# Patient Record
Sex: MALE | Race: OTHER | HISPANIC OR LATINO | URBAN - METROPOLITAN AREA
[De-identification: names, ages, dates, MRNs, and addresses within clinical notes are randomized per-mention and may not be internally consistent; named-entity substitution may affect disease eponyms.]

---

## 2019-10-11 ENCOUNTER — INPATIENT (INPATIENT)
Facility: HOSPITAL | Age: 44
LOS: 3 days | Discharge: HOME | DRG: 392 | End: 2019-10-15
Attending: INTERNAL MEDICINE | Admitting: INTERNAL MEDICINE
Payer: MEDICAID

## 2019-10-11 VITALS
TEMPERATURE: 98 F | RESPIRATION RATE: 20 BRPM | OXYGEN SATURATION: 100 % | HEART RATE: 86 BPM | SYSTOLIC BLOOD PRESSURE: 150 MMHG | WEIGHT: 160.06 LBS | DIASTOLIC BLOOD PRESSURE: 93 MMHG

## 2019-10-11 DIAGNOSIS — Z29.9 ENCOUNTER FOR PROPHYLACTIC MEASURES, UNSPECIFIED: ICD-10-CM

## 2019-10-11 DIAGNOSIS — R10.11 RIGHT UPPER QUADRANT PAIN: ICD-10-CM

## 2019-10-11 LAB
ALBUMIN SERPL ELPH-MCNC: 4.1 G/DL — SIGNIFICANT CHANGE UP (ref 3.5–5)
ALP SERPL-CCNC: 102 U/L — SIGNIFICANT CHANGE UP (ref 40–120)
ALT FLD-CCNC: 27 U/L DA — SIGNIFICANT CHANGE UP (ref 10–60)
ANION GAP SERPL CALC-SCNC: 8 MMOL/L — SIGNIFICANT CHANGE UP (ref 5–17)
AST SERPL-CCNC: 12 U/L — SIGNIFICANT CHANGE UP (ref 10–40)
BASOPHILS # BLD AUTO: 0.05 K/UL — SIGNIFICANT CHANGE UP (ref 0–0.2)
BASOPHILS NFR BLD AUTO: 0.8 % — SIGNIFICANT CHANGE UP (ref 0–2)
BILIRUB SERPL-MCNC: 1.8 MG/DL — HIGH (ref 0.2–1.2)
BUN SERPL-MCNC: 9 MG/DL — SIGNIFICANT CHANGE UP (ref 7–18)
CALCIUM SERPL-MCNC: 9.8 MG/DL — SIGNIFICANT CHANGE UP (ref 8.4–10.5)
CHLORIDE SERPL-SCNC: 101 MMOL/L — SIGNIFICANT CHANGE UP (ref 96–108)
CO2 SERPL-SCNC: 25 MMOL/L — SIGNIFICANT CHANGE UP (ref 22–31)
CREAT SERPL-MCNC: 0.79 MG/DL — SIGNIFICANT CHANGE UP (ref 0.5–1.3)
EOSINOPHIL # BLD AUTO: 0.08 K/UL — SIGNIFICANT CHANGE UP (ref 0–0.5)
EOSINOPHIL NFR BLD AUTO: 1.2 % — SIGNIFICANT CHANGE UP (ref 0–6)
GLUCOSE SERPL-MCNC: 233 MG/DL — HIGH (ref 70–99)
HCT VFR BLD CALC: 43.5 % — SIGNIFICANT CHANGE UP (ref 39–50)
HGB BLD-MCNC: 15.2 G/DL — SIGNIFICANT CHANGE UP (ref 13–17)
IMM GRANULOCYTES NFR BLD AUTO: 0.2 % — SIGNIFICANT CHANGE UP (ref 0–1.5)
LIDOCAIN IGE QN: 132 U/L — SIGNIFICANT CHANGE UP (ref 73–393)
LYMPHOCYTES # BLD AUTO: 1.75 K/UL — SIGNIFICANT CHANGE UP (ref 1–3.3)
LYMPHOCYTES # BLD AUTO: 26.8 % — SIGNIFICANT CHANGE UP (ref 13–44)
MCHC RBC-ENTMCNC: 30.2 PG — SIGNIFICANT CHANGE UP (ref 27–34)
MCHC RBC-ENTMCNC: 34.9 GM/DL — SIGNIFICANT CHANGE UP (ref 32–36)
MCV RBC AUTO: 86.5 FL — SIGNIFICANT CHANGE UP (ref 80–100)
MONOCYTES # BLD AUTO: 0.36 K/UL — SIGNIFICANT CHANGE UP (ref 0–0.9)
MONOCYTES NFR BLD AUTO: 5.5 % — SIGNIFICANT CHANGE UP (ref 2–14)
NEUTROPHILS # BLD AUTO: 4.27 K/UL — SIGNIFICANT CHANGE UP (ref 1.8–7.4)
NEUTROPHILS NFR BLD AUTO: 65.5 % — SIGNIFICANT CHANGE UP (ref 43–77)
NRBC # BLD: 0 /100 WBCS — SIGNIFICANT CHANGE UP (ref 0–0)
PLATELET # BLD AUTO: 357 K/UL — SIGNIFICANT CHANGE UP (ref 150–400)
POTASSIUM SERPL-MCNC: 3.8 MMOL/L — SIGNIFICANT CHANGE UP (ref 3.5–5.3)
POTASSIUM SERPL-SCNC: 3.8 MMOL/L — SIGNIFICANT CHANGE UP (ref 3.5–5.3)
PROT SERPL-MCNC: 8.2 G/DL — SIGNIFICANT CHANGE UP (ref 6–8.3)
RBC # BLD: 5.03 M/UL — SIGNIFICANT CHANGE UP (ref 4.2–5.8)
RBC # FLD: 11.8 % — SIGNIFICANT CHANGE UP (ref 10.3–14.5)
SODIUM SERPL-SCNC: 134 MMOL/L — LOW (ref 135–145)
WBC # BLD: 6.52 K/UL — SIGNIFICANT CHANGE UP (ref 3.8–10.5)
WBC # FLD AUTO: 6.52 K/UL — SIGNIFICANT CHANGE UP (ref 3.8–10.5)

## 2019-10-11 PROCEDURE — 99285 EMERGENCY DEPT VISIT HI MDM: CPT

## 2019-10-11 PROCEDURE — 74181 MRI ABDOMEN W/O CONTRAST: CPT | Mod: 26

## 2019-10-11 PROCEDURE — 76705 ECHO EXAM OF ABDOMEN: CPT | Mod: 26

## 2019-10-11 RX ORDER — PANTOPRAZOLE SODIUM 20 MG/1
40 TABLET, DELAYED RELEASE ORAL DAILY
Refills: 0 | Status: DISCONTINUED | OUTPATIENT
Start: 2019-10-11 | End: 2019-10-14

## 2019-10-11 RX ORDER — ACETAMINOPHEN 500 MG
650 TABLET ORAL EVERY 6 HOURS
Refills: 0 | Status: DISCONTINUED | OUTPATIENT
Start: 2019-10-11 | End: 2019-10-15

## 2019-10-11 RX ORDER — SODIUM CHLORIDE 9 MG/ML
1000 INJECTION INTRAMUSCULAR; INTRAVENOUS; SUBCUTANEOUS
Refills: 0 | Status: DISCONTINUED | OUTPATIENT
Start: 2019-10-11 | End: 2019-10-13

## 2019-10-11 RX ORDER — INFLUENZA VIRUS VACCINE 15; 15; 15; 15 UG/.5ML; UG/.5ML; UG/.5ML; UG/.5ML
0.5 SUSPENSION INTRAMUSCULAR ONCE
Refills: 0 | Status: COMPLETED | OUTPATIENT
Start: 2019-10-11 | End: 2019-10-11

## 2019-10-11 RX ORDER — MORPHINE SULFATE 50 MG/1
2 CAPSULE, EXTENDED RELEASE ORAL EVERY 4 HOURS
Refills: 0 | Status: DISCONTINUED | OUTPATIENT
Start: 2019-10-11 | End: 2019-10-15

## 2019-10-11 RX ORDER — KETOROLAC TROMETHAMINE 30 MG/ML
15 SYRINGE (ML) INJECTION ONCE
Refills: 0 | Status: DISCONTINUED | OUTPATIENT
Start: 2019-10-11 | End: 2019-10-11

## 2019-10-11 RX ADMIN — MORPHINE SULFATE 2 MILLIGRAM(S): 50 CAPSULE, EXTENDED RELEASE ORAL at 21:57

## 2019-10-11 RX ADMIN — MORPHINE SULFATE 2 MILLIGRAM(S): 50 CAPSULE, EXTENDED RELEASE ORAL at 18:42

## 2019-10-11 RX ADMIN — PANTOPRAZOLE SODIUM 40 MILLIGRAM(S): 20 TABLET, DELAYED RELEASE ORAL at 21:17

## 2019-10-11 RX ADMIN — Medication 15 MILLIGRAM(S): at 13:40

## 2019-10-11 RX ADMIN — Medication 650 MILLIGRAM(S): at 19:41

## 2019-10-11 RX ADMIN — MORPHINE SULFATE 2 MILLIGRAM(S): 50 CAPSULE, EXTENDED RELEASE ORAL at 21:35

## 2019-10-11 RX ADMIN — MORPHINE SULFATE 2 MILLIGRAM(S): 50 CAPSULE, EXTENDED RELEASE ORAL at 18:12

## 2019-10-11 RX ADMIN — Medication 15 MILLIGRAM(S): at 11:07

## 2019-10-11 RX ADMIN — Medication 650 MILLIGRAM(S): at 20:51

## 2019-10-11 NOTE — H&P ADULT - NSHPSOCIALHISTORY_GEN_ALL_CORE
occasional alcohol intake, non smoker occasional alcohol intake, non smoker, denies illicit drug use

## 2019-10-11 NOTE — H&P ADULT - ATTENDING COMMENTS
Patient seen and examined in ED. Patient's history, vitals, labs, imaging studies reviewed. Discussed with above resident, agree with note with edits, and educated on the diagnosis and management of above medical conditions. Plan of care discussed with patient, and agrees, all questions answered.   Gia Fish MD  10/11/2019

## 2019-10-11 NOTE — ED PROVIDER NOTE - CARE PLAN
Principal Discharge DX:	Right upper quadrant abdominal pain  Goal:	presumed choledocholithiasis  Secondary Diagnosis:	Elevated bilirubin

## 2019-10-11 NOTE — H&P ADULT - HISTORY OF PRESENT ILLNESS
43 y/o M with no significant PMHx presents to ED complaining of 1w of RUQ abdominal pain that is described as moderate to severe, constant and sharp and is not associated with food. Pt adds he has nausea and that these symptoms never happened before. Pt denies drinking or any surgical history. Pt denies vomiting, diarrhea, fevers, or any other complaints. 45 yo M with no significant PMHx presented to ED complaining RUQ abdominal pain. Pain started 3 weeks ago, constant, radiating to back, initially was mild but has worsened to severe pain in last 1 week, sharp in nature, not associated with eating. He has nausea and that these symptoms never happened before. Pt denies drinking or any surgical history. Pt denies vomiting, diarrhea, fevers, or any other complaints.

## 2019-10-11 NOTE — ED ADULT NURSE NOTE - NSIMPLEMENTINTERV_GEN_ALL_ED
Implemented All Universal Safety Interventions:  [place] to call system. Call bell, personal items and telephone within reach. Instruct patient to call for assistance. Room bathroom lighting operational. Non-slip footwear when patient is off stretcher. Physically safe environment: no spills, clutter or unnecessary equipment. Stretcher in lowest position, wheels locked, appropriate side rails in place.

## 2019-10-11 NOTE — ED PROVIDER NOTE - PROGRESS NOTE DETAILS
dw surg, no surg intervention right now - rec for admit to med. discussed the case with the admitting MD who accepts the patient for admission

## 2019-10-11 NOTE — H&P ADULT - ASSESSMENT
43 yo M with no significant PMHx presented to ED complaining RUQ abdominal pain. Pain started 3 weeks ago, constant, radiating to back, initially was mild but has worsened to severe pain in last 1 week, sharp in nature, not associated with eating. He has nausea and that these symptoms never happened before. Pt denies drinking or any surgical history. Pt denies vomiting, diarrhea, fevers, or any other complains     Admitted for RUQ pain

## 2019-10-11 NOTE — H&P ADULT - PROBLEM SELECTOR PLAN 1
- p/w RUQ pain   WBC: WNL   Total Bilirubin: 1.8   US Liver: CBD: 5.4 mm mild dilation   -MRCP done to rule out choledocholithiasis:  MRCP:  Common bile duct measures 4 mm. No gallstones or biliary ductal dilatation.  -Surgery consult in AM by primary team   - DR Hernandez consulted for GI   - conservative management for now   - IV fluids   Will keep NPO for now   - Morphine for severe pain

## 2019-10-11 NOTE — H&P ADULT - NSHPREVIEWOFSYSTEMS_GEN_ALL_CORE
REVIEW OF SYSTEMS:  CONSTITUTIONAL: No fever,   EYES: no acute visual disturbances  NECK: No pain or stiffness  RESPIRATORY: No cough; No shortness of breath  CARDIOVASCULAR: No chest pain, no palpitations  GASTROINTESTINAL: No pain. No nausea or vomiting; No diarrhea   NEUROLOGICAL: No headache or numbness, no tremors  MUSCULOSKELETAL: No joint pain, no muscle pain  GENITOURINARY: no dysuria, no frequency, no hesitancy  PSYCHIATRY: no depression , no anxiety  ALL OTHER  ROS negative REVIEW OF SYSTEMS:  CONSTITUTIONAL: No fever,   EYES: no acute visual disturbances  NECK: No pain or stiffness  RESPIRATORY: No cough; No shortness of breath  CARDIOVASCULAR: No chest pain, no palpitations  GASTROINTESTINAL: as above    NEUROLOGICAL: No headache or numbness, no tremors  MUSCULOSKELETAL: No joint pain, no muscle pain  GENITOURINARY: no dysuria, no frequency, no hesitancy  PSYCHIATRY: no depression , no anxiety  ALL OTHER  ROS negative

## 2019-10-11 NOTE — ED PROVIDER NOTE - OBJECTIVE STATEMENT
45 y/o M with no significant PMHx presents to ED complaining of 1w of RUQ abdominal pain that is described as moderate to severe, constant and sharp and is not associated with food. Pt adds he has nausea and that these symptoms never happened before. Pt denies drinking or any surgical history. Pt denies vomiting, diarrhea, fevers, or any other complaints. NKDA.

## 2019-10-11 NOTE — H&P ADULT - NSHPPHYSICALEXAM_GEN_ALL_CORE
Vital Signs Last 24 Hrs  T(C): 36.6 (11 Oct 2019 20:53), Max: 37 (11 Oct 2019 19:00)  T(F): 97.8 (11 Oct 2019 20:53), Max: 98.6 (11 Oct 2019 19:00)  HR: 71 (11 Oct 2019 20:53) (67 - 86)  BP: 147/91 (11 Oct 2019 20:53) (122/74 - 156/90)  BP(mean): 105 (11 Oct 2019 18:10) (105 - 105)  RR: 24 (11 Oct 2019 20:53) (18 - 24)  SpO2: 100% (11 Oct 2019 20:53) (99% - 100%)      PHYSICAL EXAM:  GENERAL: male in bed   HEENT: Normocephalic;  conjunctivae and sclerae clear; moist mucous membranes;   NECK : supple  CHEST/LUNG: Clear to auscultation bilaterally with good air entry   HEART: S1 S2  regular; no murmurs, gallops or rubs  ABDOMEN: Soft, , Nondistended; Bowel sounds present  Tenderness present in RUQ and epigastric region   EXTREMITIES: no cyanosis; no edema; no calf tenderness  SKIN: warm and dry; no rash  NERVOUS SYSTEM:  Awake and alert; Oriented  to place, person and time ; no new deficits

## 2019-10-11 NOTE — ED PROVIDER NOTE - CLINICAL SUMMARY MEDICAL DECISION MAKING FREE TEXT BOX
45 y/o M presents to ED complaining of RUQ abdominal pain x1w. Probably biliary colic vs acute coli. Will start with RUQ sonogram and basic blood work and symptoms treatement.

## 2019-10-12 LAB
24R-OH-CALCIDIOL SERPL-MCNC: 16.2 NG/ML — LOW (ref 30–80)
ALBUMIN SERPL ELPH-MCNC: 3.8 G/DL — SIGNIFICANT CHANGE UP (ref 3.5–5)
ALP SERPL-CCNC: 88 U/L — SIGNIFICANT CHANGE UP (ref 40–120)
ALT FLD-CCNC: 26 U/L DA — SIGNIFICANT CHANGE UP (ref 10–60)
ANION GAP SERPL CALC-SCNC: 6 MMOL/L — SIGNIFICANT CHANGE UP (ref 5–17)
AST SERPL-CCNC: 18 U/L — SIGNIFICANT CHANGE UP (ref 10–40)
BASOPHILS # BLD AUTO: 0.03 K/UL — SIGNIFICANT CHANGE UP (ref 0–0.2)
BASOPHILS NFR BLD AUTO: 0.5 % — SIGNIFICANT CHANGE UP (ref 0–2)
BILIRUB DIRECT SERPL-MCNC: 0.4 MG/DL — HIGH (ref 0–0.2)
BILIRUB INDIRECT FLD-MCNC: 1.6 MG/DL — HIGH (ref 0.2–1)
BILIRUB SERPL-MCNC: 2 MG/DL — HIGH (ref 0.2–1.2)
BILIRUB SERPL-MCNC: 2 MG/DL — HIGH (ref 0.2–1.2)
BUN SERPL-MCNC: 11 MG/DL — SIGNIFICANT CHANGE UP (ref 7–18)
CALCIUM SERPL-MCNC: 9.4 MG/DL — SIGNIFICANT CHANGE UP (ref 8.4–10.5)
CHLORIDE SERPL-SCNC: 106 MMOL/L — SIGNIFICANT CHANGE UP (ref 96–108)
CHOLEST SERPL-MCNC: 172 MG/DL — SIGNIFICANT CHANGE UP (ref 10–199)
CO2 SERPL-SCNC: 26 MMOL/L — SIGNIFICANT CHANGE UP (ref 22–31)
CREAT SERPL-MCNC: 0.73 MG/DL — SIGNIFICANT CHANGE UP (ref 0.5–1.3)
EOSINOPHIL # BLD AUTO: 0.09 K/UL — SIGNIFICANT CHANGE UP (ref 0–0.5)
EOSINOPHIL NFR BLD AUTO: 1.6 % — SIGNIFICANT CHANGE UP (ref 0–6)
FOLATE SERPL-MCNC: >20 NG/ML — SIGNIFICANT CHANGE UP
GLUCOSE BLDC GLUCOMTR-MCNC: 132 MG/DL — HIGH (ref 70–99)
GLUCOSE SERPL-MCNC: 155 MG/DL — HIGH (ref 70–99)
HBA1C BLD-MCNC: 10.4 % — HIGH (ref 4–5.6)
HCT VFR BLD CALC: 40.8 % — SIGNIFICANT CHANGE UP (ref 39–50)
HDLC SERPL-MCNC: 34 MG/DL — LOW
HGB BLD-MCNC: 14.2 G/DL — SIGNIFICANT CHANGE UP (ref 13–17)
IMM GRANULOCYTES NFR BLD AUTO: 0 % — SIGNIFICANT CHANGE UP (ref 0–1.5)
LIPID PNL WITH DIRECT LDL SERPL: 104 MG/DL — SIGNIFICANT CHANGE UP
LYMPHOCYTES # BLD AUTO: 1.82 K/UL — SIGNIFICANT CHANGE UP (ref 1–3.3)
LYMPHOCYTES # BLD AUTO: 32 % — SIGNIFICANT CHANGE UP (ref 13–44)
MAGNESIUM SERPL-MCNC: 2.1 MG/DL — SIGNIFICANT CHANGE UP (ref 1.6–2.6)
MCHC RBC-ENTMCNC: 30.7 PG — SIGNIFICANT CHANGE UP (ref 27–34)
MCHC RBC-ENTMCNC: 34.8 GM/DL — SIGNIFICANT CHANGE UP (ref 32–36)
MCV RBC AUTO: 88.1 FL — SIGNIFICANT CHANGE UP (ref 80–100)
MONOCYTES # BLD AUTO: 0.32 K/UL — SIGNIFICANT CHANGE UP (ref 0–0.9)
MONOCYTES NFR BLD AUTO: 5.6 % — SIGNIFICANT CHANGE UP (ref 2–14)
NEUTROPHILS # BLD AUTO: 3.43 K/UL — SIGNIFICANT CHANGE UP (ref 1.8–7.4)
NEUTROPHILS NFR BLD AUTO: 60.3 % — SIGNIFICANT CHANGE UP (ref 43–77)
NRBC # BLD: 0 /100 WBCS — SIGNIFICANT CHANGE UP (ref 0–0)
PHOSPHATE SERPL-MCNC: 5 MG/DL — HIGH (ref 2.5–4.5)
PLATELET # BLD AUTO: 321 K/UL — SIGNIFICANT CHANGE UP (ref 150–400)
POTASSIUM SERPL-MCNC: 3.9 MMOL/L — SIGNIFICANT CHANGE UP (ref 3.5–5.3)
POTASSIUM SERPL-SCNC: 3.9 MMOL/L — SIGNIFICANT CHANGE UP (ref 3.5–5.3)
PROT SERPL-MCNC: 7.3 G/DL — SIGNIFICANT CHANGE UP (ref 6–8.3)
RBC # BLD: 4.63 M/UL — SIGNIFICANT CHANGE UP (ref 4.2–5.8)
RBC # FLD: 11.5 % — SIGNIFICANT CHANGE UP (ref 10.3–14.5)
SODIUM SERPL-SCNC: 138 MMOL/L — SIGNIFICANT CHANGE UP (ref 135–145)
TOTAL CHOLESTEROL/HDL RATIO MEASUREMENT: 5.1 RATIO — SIGNIFICANT CHANGE UP (ref 3.4–9.6)
TRIGL SERPL-MCNC: 168 MG/DL — HIGH (ref 10–149)
TSH SERPL-MCNC: 2.22 UU/ML — SIGNIFICANT CHANGE UP (ref 0.34–4.82)
VIT B12 SERPL-MCNC: 547 PG/ML — SIGNIFICANT CHANGE UP (ref 232–1245)
WBC # BLD: 5.69 K/UL — SIGNIFICANT CHANGE UP (ref 3.8–10.5)
WBC # FLD AUTO: 5.69 K/UL — SIGNIFICANT CHANGE UP (ref 3.8–10.5)

## 2019-10-12 PROCEDURE — 74177 CT ABD & PELVIS W/CONTRAST: CPT | Mod: 26

## 2019-10-12 RX ORDER — KETOROLAC TROMETHAMINE 30 MG/ML
15 SYRINGE (ML) INJECTION EVERY 6 HOURS
Refills: 0 | Status: DISCONTINUED | OUTPATIENT
Start: 2019-10-12 | End: 2019-10-15

## 2019-10-12 RX ORDER — METFORMIN HYDROCHLORIDE 850 MG/1
500 TABLET ORAL
Refills: 0 | Status: DISCONTINUED | OUTPATIENT
Start: 2019-10-12 | End: 2019-10-15

## 2019-10-12 RX ADMIN — PANTOPRAZOLE SODIUM 40 MILLIGRAM(S): 20 TABLET, DELAYED RELEASE ORAL at 12:47

## 2019-10-12 RX ADMIN — MORPHINE SULFATE 2 MILLIGRAM(S): 50 CAPSULE, EXTENDED RELEASE ORAL at 10:54

## 2019-10-12 RX ADMIN — MORPHINE SULFATE 2 MILLIGRAM(S): 50 CAPSULE, EXTENDED RELEASE ORAL at 05:09

## 2019-10-12 RX ADMIN — MORPHINE SULFATE 2 MILLIGRAM(S): 50 CAPSULE, EXTENDED RELEASE ORAL at 01:56

## 2019-10-12 RX ADMIN — MORPHINE SULFATE 2 MILLIGRAM(S): 50 CAPSULE, EXTENDED RELEASE ORAL at 11:15

## 2019-10-12 RX ADMIN — MORPHINE SULFATE 2 MILLIGRAM(S): 50 CAPSULE, EXTENDED RELEASE ORAL at 02:30

## 2019-10-12 RX ADMIN — METFORMIN HYDROCHLORIDE 500 MILLIGRAM(S): 850 TABLET ORAL at 17:36

## 2019-10-12 RX ADMIN — Medication 15 MILLIGRAM(S): at 15:15

## 2019-10-12 RX ADMIN — Medication 15 MILLIGRAM(S): at 22:04

## 2019-10-12 RX ADMIN — Medication 15 MILLIGRAM(S): at 14:52

## 2019-10-12 RX ADMIN — MORPHINE SULFATE 2 MILLIGRAM(S): 50 CAPSULE, EXTENDED RELEASE ORAL at 05:21

## 2019-10-12 NOTE — DISCHARGE NOTE PROVIDER - HOSPITAL COURSE
43 yo M with no significant PMHx presented to ED complaining RUQ abdominal pain. Pain started 3 weeks ago, constant, radiating to back, initially was mild but has worsened to severe pain in last 1 week, sharp in nature, not associated with eating. He has nausea and that these symptoms never happened before. Pt denies drinking or any surgical history.    US LIVER AND PANCREAS -  Enlarged, fatty liver. No cholelithiasis or biliary ductal dilatation    MRCP -No gallstones or biliary ductal dilatation. GI consulted ??            (NOT COMPLETED) 44 year old male with no significant PMHx presented to ED complaining RUQ abdominal pain. Pain started 3 weeks ago, constant, radiating to back, initially was mild but has worsened to severe pain in last 1 week, sharp in nature, not associated with eating. He has nausea and that these symptoms never happened before. Pt denies drinking or any surgical history. Pt denies vomiting, diarrhea, fevers, or any other complaints. Admitted for RUQ pain.        Liver sono was noted with CBD 5.4mm dilation, so MRCP was performed, CBD measures 4mm without gallstone or biliary duct dilation. Pt states his pain does not related with food, no tenderness on exam either, continuos pain to RUQ per pt. c/w pain meds. Due to Rt kidney cyst, renal sono done showed 1.4cm Rt kidney cyst. Urology called for rt kidney cyst.             NOT COMPLETE  pt has no PCP -- asked to refer any internist 44 year old male with no significant PMHx presented to ED complaining RUQ abdominal pain. Pain started 3 weeks ago, constant, radiating to back, initially was mild but has worsened to severe pain in last 1 week, sharp in nature, not associated with eating. He has nausea and that these symptoms never happened before. Pt denies drinking or any surgical history. Pt denies vomiting, diarrhea, fevers, or any other complaints. Admitted for RUQ pain.        Liver sono was noted with CBD 5.4mm dilation, so MRCP was performed. Common Bile Duct measures 4mm without gallstone or biliary duct dilation. Pt states his pain does not related with food, no tenderness on exam either, continuos pain to RUQ per pt. c/w pain meds. Due to Rt kidney cyst, renal sono done showed 1.4cm Rt kidney cyst. Urology was consulted for rt kidney cyst. Urology stated that there is no inpatient intervention at this time, pt can follow up with Dr Shah in the clinic.                    NOT COMPLETE  pt has no PCP -- asked to refer any internist 44 year old male with no significant PMHx presented to ED complaining RUQ abdominal pain. Pain started 3 weeks ago, constant, radiating to back, initially was mild but has worsened to severe pain in last 1 week, sharp in nature, not associated with eating. He has nausea and that these symptoms never happened before. Pt denies drinking or any surgical history. Pt denies vomiting, diarrhea, fevers, or any other complaints. Admitted for RUQ pain.        Liver sono was noted with CBD 5.4mm dilation, so MRCP was performed. Common Bile Duct measures 4mm without gallstone or biliary duct dilation. Pt states his pain does not related with food, no tenderness on exam either. Discussed the case with Dr. Hernandez, 4mm CBD is not dilated and that does not cause that pain, recommended to d/c  morphine and toradol, continue with tylenol for pain. Due to Rt kidney cyst, renal sono done showed 1.4cm Rt kidney cyst. Urology was consulted for rt kidney cyst. Urology stated that there is no inpatient intervention at this time, pt can follow up with Dr Shah in the clinic. Pt stated that he had Lt intercostal rib pain in the past in [facility]. No fractures, no swelling or discoloration noted, able to walk and bending. He was on morphine and toradol IV for past 5days, will be discharge with Lidocaine patch and tylenol for pain and so he agreed to go to pain office as outpatient      Pt tolerates regular diet well, blood work all normal. Explained all of these via  ID [id number].  Pt is medically stable for discharge. 44 year old male with no significant PMHx presented to ED complaining RUQ abdominal pain. Pain started 3 weeks ago, constant, radiating to back, initially was mild but has worsened to severe pain in last 1 week, sharp in nature, not associated with eating. He has nausea and that these symptoms never happened before. Pt denies drinking or any surgical history. Pt denies vomiting, diarrhea, fevers, or any other complaints. Admitted for RUQ pain.        Liver sono was noted with CBD 5.4mm dilation, so MRCP was performed. Common Bile Duct measures 4mm without gallstone or biliary duct dilation. Pt states his pain does not related with food, no tenderness on exam either. Discussed the case with GI Dr. Hernandez, 4mm CBD is not dilated and that does not cause that pain, recommended to d/c  morphine and toradol, continue with tylenol for pain. Renal sono showed 1.4 cm right kidney cyst. Urology was consulted for right kidney cyst. Urology stated that there is no inpatient intervention at this time, pt can follow up with Dr Shah in the clinic. Pt stated that he had intercostal rib pain in the past in [place]. No fractures, no swelling or discoloration noted, able to walk and bending. He was on morphine and toradol IV, with improvement, will be discharged with Lidocaine patch and tylenol for pain and he agreed to go to pain office as outpatient      Pt tolerates regular diet well, blood work all normal. Explained all of these via  ID [id number].  Pt is medically stable for discharge.

## 2019-10-12 NOTE — DISCHARGE NOTE PROVIDER - NSDCCPCAREPLAN_GEN_ALL_CORE_FT
PRINCIPAL DISCHARGE DIAGNOSIS  Diagnosis: Right upper quadrant abdominal pain  Assessment and Plan of Treatment: you got several imaging tests due to this pain.  you have Rt cyst 1.4cm  Urology consulted and recommend  -------   If you have sever pain which does not go away with pain medicaiton we prescribed for you please call  PMD who we recommended you for follow up as outpatinet      SECONDARY DISCHARGE DIAGNOSES  Diagnosis: Diabetes  Assessment and Plan of Treatment: HgA1C 10.4%, that means you are diabetic.   Make sure you get your HgA1c checked every three months.  If you take oral diabetes medications, check your blood glucose two times a day.  If you take insulin, check your blood glucose before meals and at bedtime.  It's important not to skip any meals.  Keep a log of your blood glucose results and always take it with you to your doctor appointments.  Keep a list of your current medications including injectables and over the counter medications and bring this medication list with you to all your doctor appointments.  If you have not seen your ophthalmologist this year call for appointment.  Check your feet daily for redness, sores, or openings. Do not self treat. If no improvement in two days call your primary care physician for an appointment.  Low blood sugar (hypoglycemia) is a blood sugar below 70mg/dl. Check your blood sugar if you feel signs/symptoms of hypoglycemia. If your blood sugar is below 70 take 15 grams of carbohydrates (ex 4 oz of apple juice, 3-4 glucose tablets, or 4-6 oz of regular soda) wait 15 minutes and repeat blood sugar to make sure it comes up above 70.  If your blood sugar is above 70 and you are due for a meal, have a meal.  If you are not due for a meal have a snack.  This snack helps keeps your blood sugar at a safe range.    Diagnosis: Renal cyst  Assessment and Plan of Treatment: this is  fluid filled form in the kidney, you have 1.4cm cyst in your Right kidney.  this this faily common as people age and usually do not cause symptoms or harm   sings to call physicain/ health care provider -  -- fever   --pain in your back or side between ribs and pelvis or upper abdomen   --swelling of abdomen  --urinating more often than usual.   --blood in urine    Diagnosis: Common bile duct dilatation  Assessment and Plan of Treatment: you have 4mm of common bile duct dilatation  If you have  jaundice, itching , dark urine , abdominal pain, loss of appetite, nausea and vomiting  please call your health care provider because you might need some of the treatment such as ERCP to remove small stones or to place a stent   follow up with gastroenterology    Diagnosis: Fatty liver  Assessment and Plan of Treatment: major risk factors include obesity and type 2 diabetes   you were diagnosed with type 2 diabetes during this admission  you need blood work on regular basis  stop drinking alcohol   when symptoms occur, such as fatigue, weight loss and abdominal pain please call primary doctor    Diagnosis: Elevated bilirubin  Assessment and Plan of Treatment: It might be due to common bild duct dilatation  If you have  jaundice, itching , dark urine , abdominal pain, loss of appetite, nausea and vomiting    Please follow up with primary doctor on regular basis PRINCIPAL DISCHARGE DIAGNOSIS  Diagnosis: Right upper quadrant abdominal pain  Assessment and Plan of Treatment: you got several imaging tests due to this pain.  you have Rt cyst 1.4cm  Urology consulted and recommend  -------   If you have sever pain which does not go away with pain medicaiton we prescribed for you please call  PMD who we recommended you for follow up as outpatinet      SECONDARY DISCHARGE DIAGNOSES  Diagnosis: Intercostal muscle pain  Assessment and Plan of Treatment: Rt side to back  continue lidocaine patch as prescribed and follow with pain management specialist  you can find out PT as well       Diagnosis: Diabetes  Assessment and Plan of Treatment: HgA1C 10.4%, that means you are diabetic.   Make sure you get your HgA1c checked every three months.  If you take oral diabetes medications, check your blood glucose two times a day.  If you take insulin, check your blood glucose before meals and at bedtime.  It's important not to skip any meals.  Keep a log of your blood glucose results and always take it with you to your doctor appointments.  Keep a list of your current medications including injectables and over the counter medications and bring this medication list with you to all your doctor appointments.  If you have not seen your ophthalmologist this year call for appointment.  Check your feet daily for redness, sores, or openings. Do not self treat. If no improvement in two days call your primary care physician for an appointment.  Low blood sugar (hypoglycemia) is a blood sugar below 70mg/dl. Check your blood sugar if you feel signs/symptoms of hypoglycemia. If your blood sugar is below 70 take 15 grams of carbohydrates (ex 4 oz of apple juice, 3-4 glucose tablets, or 4-6 oz of regular soda) wait 15 minutes and repeat blood sugar to make sure it comes up above 70.  If your blood sugar is above 70 and you are due for a meal, have a meal.  If you are not due for a meal have a snack.  This snack helps keeps your blood sugar at a safe range.    Diagnosis: Renal cyst  Assessment and Plan of Treatment: this is  fluid filled form in the kidney, you have 1.4cm cyst in your Right kidney.  this this faily common as people age and usually do not cause symptoms or harm   sings to call physicain/ health care provider -  -- fever   --pain in your back or side between ribs and pelvis or upper abdomen   --swelling of abdomen  --urinating more often than usual.   --blood in urine    Diagnosis: Common bile duct dilatation  Assessment and Plan of Treatment: you have 4mm of common bile duct dilatation  If you have  jaundice, itching , dark urine , abdominal pain, loss of appetite, nausea and vomiting  please call your health care provider because you might need some of the treatment such as ERCP to remove small stones or to place a stent   follow up with gastroenterology    Diagnosis: Fatty liver  Assessment and Plan of Treatment: major risk factors include obesity and type 2 diabetes   you were diagnosed with type 2 diabetes during this admission  you need blood work on regular basis  stop drinking alcohol   when symptoms occur, such as fatigue, weight loss and abdominal pain please call primary doctor    Diagnosis: Elevated bilirubin  Assessment and Plan of Treatment: It might be due to common bild duct dilatation  If you have  jaundice, itching , dark urine , abdominal pain, loss of appetite, nausea and vomiting    Please follow up with primary doctor on regular basis PRINCIPAL DISCHARGE DIAGNOSIS  Diagnosis: Right upper quadrant abdominal pain  Assessment and Plan of Treatment: you got several imaging tests due to this pain.  you have Rt cyst 1.4cm  Urology consulted and recommend outpatient follow up.  If you have severe pain which does not go away with pain medicaiton we prescribed for you please call  PMD who we recommended you for follow up as outpatinet      SECONDARY DISCHARGE DIAGNOSES  Diagnosis: Intercostal muscle pain  Assessment and Plan of Treatment: Rt side to back  continue lidocaine patch as prescribed and follow with pain management specialist  you can find out PT as well       Diagnosis: Common bile duct dilatation  Assessment and Plan of Treatment: you have 4mm of common bile duct dilatation  If you have  jaundice, itching , dark urine , abdominal pain, loss of appetite, nausea and vomiting  please call your health care provider because you might need some of the treatment such as ERCP to remove small stones or to place a stent   follow up with gastroenterology    Diagnosis: Diabetes  Assessment and Plan of Treatment: HgA1C 10.4%, that means you are diabetic.   Make sure you get your HgA1c checked every three months.  If you take oral diabetes medications, check your blood glucose two times a day.  If you take insulin, check your blood glucose before meals and at bedtime.  It's important not to skip any meals.  Keep a log of your blood glucose results and always take it with you to your doctor appointments.  Keep a list of your current medications including injectables and over the counter medications and bring this medication list with you to all your doctor appointments.  If you have not seen your ophthalmologist this year call for appointment.  Check your feet daily for redness, sores, or openings. Do not self treat. If no improvement in two days call your primary care physician for an appointment.  Low blood sugar (hypoglycemia) is a blood sugar below 70mg/dl. Check your blood sugar if you feel signs/symptoms of hypoglycemia. If your blood sugar is below 70 take 15 grams of carbohydrates (ex 4 oz of apple juice, 3-4 glucose tablets, or 4-6 oz of regular soda) wait 15 minutes and repeat blood sugar to make sure it comes up above 70.  If your blood sugar is above 70 and you are due for a meal, have a meal.  If you are not due for a meal have a snack.  This snack helps keeps your blood sugar at a safe range.    Diagnosis: Renal cyst  Assessment and Plan of Treatment: this is  fluid filled form in the kidney, you have 1.4cm cyst in your Right kidney.  this this faily common as people age and usually do not cause symptoms or harm   sings to call physicain/ health care provider -  -- fever   --pain in your back or side between ribs and pelvis or upper abdomen   --swelling of abdomen  --urinating more often than usual.   --blood in urine    Diagnosis: Fatty liver  Assessment and Plan of Treatment: major risk factors include obesity and type 2 diabetes   you were diagnosed with type 2 diabetes during this admission  you need blood work on regular basis  stop drinking alcohol   when symptoms occur, such as fatigue, weight loss and abdominal pain please call primary doctor    Diagnosis: Elevated bilirubin  Assessment and Plan of Treatment: It might be due to common bild duct dilatation  If you have  jaundice, itching , dark urine , abdominal pain, loss of appetite, nausea and vomiting    Please follow up with primary doctor on regular basis

## 2019-10-12 NOTE — PROGRESS NOTE ADULT - ASSESSMENT
45 yo M with no significant PMHx presented to ED complaining RUQ abdominal pain. Pain started 3 weeks ago, constant, radiating to back, initially was mild but has worsened to severe pain in last 1 week, sharp in nature, not associated with eating. He has nausea and that these symptoms never happened before. Pt denies drinking or any surgical history. Pt denies vomiting, diarrhea, fevers, or any other complains     Admitted for RUQ pain       > Right upper quadrant abdominal pain.  Plan: - p/w RUQ pain   WBC: WNL   Total Bilirubin: 1.8   US Liver: CBD: 5.4 mm mild dilation   -MRCP done to rule out choledocholithiasis:  MRCP:  Common bile duct measures 4 mm. No gallstones or biliary ductal dilatation.  -Surgery consult in AM by primary team   - DR Hernandez consulted for GI   - conservative management for now   - IV fluids   -Morphine for severe pain 44 year old male with no significant PMHx presented to ED complaining RUQ abdominal pain. Pain started 3 weeks ago, constant, radiating to back, initially was mild but has worsened to severe pain in last 1 week, sharp in nature, not associated with eating. He has nausea and that these symptoms never happened before. Pt denies drinking or any surgical history. Pt denies vomiting, diarrhea, fevers, or any other complaints. Admitted for RUQ pain.      > Right upper quadrant abdominal pain.    WBC: WNL, Total Bilirubin: 1.8   US Liver: CBD: 5.4 mm mild dilation   MRCP done to rule out choledocholithiasis, shows: Common bile duct measures 4 mm. No gallstones or biliary ductal dilatation.  F/u Surgery consult and GI consult with David   conservative management for now   IV fluids, advance diet as tolerated  continue for Tylenol for mild pain, Ketorolac for moderate pain, and Morphine for severe pain    > Back pain  F/u CT A/P  continue for Tylenol for mild pain, Ketorolac for moderate pain, and Morphine for severe pain    > New onset diabetes - Hb A1c 10.4%, discussed with patient  start metformin  diabetes education    > Vitamin D deficiency  start Vit D supplement

## 2019-10-12 NOTE — DISCHARGE NOTE NURSING/CASE MANAGEMENT/SOCIAL WORK - PATIENT PORTAL LINK FT
You can access the FollowMyHealth Patient Portal offered by [facility] by registering at the following website: [email or web address]. By joining [employer]’s FollowMyHealth portal, you will also be able to view your health information using other applications (apps) compatible with our system.

## 2019-10-12 NOTE — DISCHARGE NOTE PROVIDER - PROVIDER TOKENS
PROVIDER:[TOKEN:[[id number]],FOLLOWUP:[2 weeks]],PROVIDER:[TOKEN:[[id number]],FOLLOWUP:[1 week]],PROVIDER:[TOKEN:[[id number]],FOLLOWUP:[2 weeks]] PROVIDER:[TOKEN:[[id number]],FOLLOWUP:[1 week]],PROVIDER:[TOKEN:[[id number]],FOLLOWUP:[2 weeks]]

## 2019-10-12 NOTE — DISCHARGE NOTE PROVIDER - CARE PROVIDER_API CALL
Vlad Ayala)  Internal Medicine  [address] [place] [address]  Phone: [phone]  Fax: [phone]  Follow Up Time: 2 weeks    Blaze Shannon)  Pain Medicine  [address] [place] [address]  Phone: [phone]  Fax: [phone]  Follow Up Time: 1 week    Guy Shah)  Urology  [address] [facility], [address] [place] [address]  Phone: [phone]  Fax: [phone]  Follow Up Time: 2 weeks Blaze Shannon (MD)  Pain Medicine  [address] [place] [address]  Phone: [phone]  Fax: [phone]  Follow Up Time: 1 week    Guy Shah (MD)  Urology  [address] [facility], [address]nd [place] [address]  Phone: [phone]  Fax: [phone]  Follow Up Time: 2 weeks

## 2019-10-13 LAB
GLUCOSE BLDC GLUCOMTR-MCNC: 119 MG/DL — HIGH (ref 70–99)
GLUCOSE BLDC GLUCOMTR-MCNC: 130 MG/DL — HIGH (ref 70–99)
GLUCOSE BLDC GLUCOMTR-MCNC: 131 MG/DL — HIGH (ref 70–99)
GLUCOSE BLDC GLUCOMTR-MCNC: 149 MG/DL — HIGH (ref 70–99)

## 2019-10-13 RX ORDER — SODIUM CHLORIDE 9 MG/ML
1000 INJECTION INTRAMUSCULAR; INTRAVENOUS; SUBCUTANEOUS
Refills: 0 | Status: DISCONTINUED | OUTPATIENT
Start: 2019-10-13 | End: 2019-10-15

## 2019-10-13 RX ORDER — ERGOCALCIFEROL 1.25 MG/1
50000 CAPSULE ORAL
Refills: 0 | Status: DISCONTINUED | OUTPATIENT
Start: 2019-10-13 | End: 2019-10-15

## 2019-10-13 RX ADMIN — Medication 15 MILLIGRAM(S): at 07:01

## 2019-10-13 RX ADMIN — METFORMIN HYDROCHLORIDE 500 MILLIGRAM(S): 850 TABLET ORAL at 17:08

## 2019-10-13 RX ADMIN — MORPHINE SULFATE 2 MILLIGRAM(S): 50 CAPSULE, EXTENDED RELEASE ORAL at 14:48

## 2019-10-13 RX ADMIN — Medication 650 MILLIGRAM(S): at 12:09

## 2019-10-13 RX ADMIN — Medication 15 MILLIGRAM(S): at 20:42

## 2019-10-13 RX ADMIN — MORPHINE SULFATE 2 MILLIGRAM(S): 50 CAPSULE, EXTENDED RELEASE ORAL at 13:50

## 2019-10-13 RX ADMIN — Medication 650 MILLIGRAM(S): at 10:17

## 2019-10-13 RX ADMIN — MORPHINE SULFATE 2 MILLIGRAM(S): 50 CAPSULE, EXTENDED RELEASE ORAL at 04:14

## 2019-10-13 RX ADMIN — Medication 15 MILLIGRAM(S): at 01:33

## 2019-10-13 RX ADMIN — Medication 15 MILLIGRAM(S): at 19:15

## 2019-10-13 RX ADMIN — MORPHINE SULFATE 2 MILLIGRAM(S): 50 CAPSULE, EXTENDED RELEASE ORAL at 03:08

## 2019-10-13 RX ADMIN — PANTOPRAZOLE SODIUM 40 MILLIGRAM(S): 20 TABLET, DELAYED RELEASE ORAL at 12:12

## 2019-10-13 RX ADMIN — METFORMIN HYDROCHLORIDE 500 MILLIGRAM(S): 850 TABLET ORAL at 05:58

## 2019-10-13 RX ADMIN — SODIUM CHLORIDE 100 MILLILITER(S): 9 INJECTION INTRAMUSCULAR; INTRAVENOUS; SUBCUTANEOUS at 15:42

## 2019-10-13 RX ADMIN — ERGOCALCIFEROL 50000 UNIT(S): 1.25 CAPSULE ORAL at 17:08

## 2019-10-13 RX ADMIN — Medication 15 MILLIGRAM(S): at 05:58

## 2019-10-13 NOTE — PROGRESS NOTE ADULT - ASSESSMENT
44 year old male with no significant PMHx presented to ED complaining RUQ abdominal pain. Pain started 3 weeks ago, constant, radiating to back, initially was mild but has worsened to severe pain in last 1 week, sharp in nature, not associated with eating. He has nausea and that these symptoms never happened before. Pt denies drinking or any surgical history. Pt denies vomiting, diarrhea, fevers, or any other complaints. Admitted for RUQ pain.      > Right upper quadrant abdominal pain.    WBC: WNL, Total Bilirubin: 1.8   US Liver: CBD: 5.4 mm mild dilation   MRCP done to rule out choledocholithiasis, shows: Common bile duct measures 4 mm. No gallstones or biliary ductal dilatation.  F/u Surgery consult and GI consult with David   conservative management for now   IV fluids, advance diet as tolerated  continue for Tylenol for mild pain, Ketorolac for moderate pain, and Morphine for severe pain    > Back pain  CT A/P noted, f/u renal sono, f/u urology consult  continue for Tylenol for mild pain, Ketorolac for moderate pain, and Morphine for severe pain    > New onset diabetes - Hb A1c 10.4%, discussed with patient  started metformin  diabetes education    > Vitamin D deficiency  started Vit D supplement

## 2019-10-13 NOTE — PROGRESS NOTE ADULT - SUBJECTIVE AND OBJECTIVE BOX
Patient is a 44y old  Male who presents with a chief complaint of RUQ Pain (12 Oct 2019 14:28)        MEDICATIONS  (STANDING):  ergocalciferol 83724 Unit(s) Oral <User Schedule>  influenza   Vaccine 0.5 milliLiter(s) IntraMuscular once  metFORMIN 500 milliGRAM(s) Oral two times a day  pantoprazole  Injectable 40 milliGRAM(s) IV Push daily  sodium chloride 0.9%. 1000 milliLiter(s) (80 mL/Hr) IV Continuous <Continuous>    MEDICATIONS  (PRN):  acetaminophen   Tablet .. 650 milliGRAM(s) Oral every 6 hours PRN Temp greater or equal to 38C (100.4F), Mild Pain (1 - 3)  ketorolac   Injectable 15 milliGRAM(s) IV Push every 6 hours PRN Moderate Pain (4 - 6)  morphine  - Injectable 2 milliGRAM(s) IV Push every 4 hours PRN Severe Pain (7 - 10)      REVIEW OF SYSTEMS:  CONSTITUTIONAL: No fever, weight loss, or fatigue  EYES: No eye pain, visual disturbances, or discharge  ENMT:  No difficulty hearing, tinnitus, vertigo; No sinus or throat pain  NECK: No pain or stiffness  RESPIRATORY: No cough, wheezing, chills or hemoptysis; No shortness of breath  CARDIOVASCULAR: No chest pain, palpitations, dizziness, or leg swelling  GASTROINTESTINAL: No abdominal or epigastric pain. No nausea, vomiting, or hematemesis; No diarrhea or constipation. No melena or hematochezia.  GENITOURINARY: No dysuria, frequency, hematuria, or incontinence  NEUROLOGICAL: No headaches, memory loss, loss of strength, numbness, or tremors  SKIN: No itching, burning, rashes, or lesions   LYMPH NODES: No enlarged glands  ENDOCRINE: No heat or cold intolerance; No hair loss  MUSCULOSKELETAL: No joint pain or swelling; No muscle, back, or extremity pain  PSYCHIATRIC: No depression, anxiety, mood swings, or difficulty sleeping  HEME/LYMPH: No easy bruising, or bleeding gums  ALLERY AND IMMUNOLOGIC: No hives or eczema    PHYSICAL EXAM:    T(C): 36.5 (10-13-19 @ 13:30), Max: 36.7 (10-12-19 @ 21:18)  HR: 77 (10-13-19 @ 13:30) (62 - 77)  BP: 144/79 (10-13-19 @ 13:30) (134/75 - 144/79)  RR: 18 (10-13-19 @ 13:30) (16 - 20)  SpO2: 100% (10-13-19 @ 13:30) (98% - 100%)  Wt(kg): --  I&O's Summary      GENERAL: NAD, well-groomed, well-developed  HEAD:  Atraumatic, Normocephalic  EYES: EOMI, PERRL, conjunctiva and sclera clear  ENMT: No tonsillar erythema, exudates, or enlargement; Moist mucous membranes, Good dentition, No lesions  NECK: Supple, No JVD, Normal thyroid  NERVOUS SYSTEM:  Alert & Oriented X3, Good concentration; Motor Strength 5/5 B/L upper and lower extremities; DTRs 2+ intact and symmetric  CHEST/LUNG: Clear to ascultation bilaterally; No rales, rhonchi, wheezing, or rubs  HEART: Regular rate and rhythm; No murmurs, rubs, or gallops  ABDOMEN: Soft, Nontender, Nondistended; Bowel sounds present  EXTREMITIES:  2+ Peripheral Pulses, No clubbing, cyanosis, or edema  LYMPH: No lymphadenopathy noted  SKIN: No rashes or lesions    LABS:                        14.2   5.69  )-----------( 321      ( 12 Oct 2019 06:53 )             40.8     10-12    138  |  106  |  11  ----------------------------<  155<H>  3.9   |  26  |  0.73    Ca    9.4      12 Oct 2019 06:53  Phos  5.0     10-12  Mg     2.1     10-12    TPro  7.3  /  Alb  3.8  /  TBili  2.0<H>  /  DBili  0.4<H>  /  AST  18  /  ALT  26  /  AlkPhos  88  10-12        CAPILLARY BLOOD GLUCOSE    POCT Blood Glucose.: 130 mg/dL (13 Oct 2019 11:59)  POCT Blood Glucose.: 131 mg/dL (13 Oct 2019 08:00)      RADIOLOGY & ADDITIONAL TESTS:    Consultant(s) Notes Reviewed:  [x] YES  [ ] NO    Care Discussed with Consultants/Other Providers [x] YES  [ ] NO Patient is a 44 year old  Male who presents with a chief complaint of RUQ Pain (12 Oct 2019 14:28)    Patient seen and examined, reports intermittent sided sided back pain    MEDICATIONS  (STANDING):  ergocalciferol 04198 Unit(s) Oral <User Schedule>  influenza   Vaccine 0.5 milliLiter(s) IntraMuscular once  metFORMIN 500 milliGRAM(s) Oral two times a day  pantoprazole  Injectable 40 milliGRAM(s) IV Push daily  sodium chloride 0.9%. 1000 milliLiter(s) (80 mL/Hr) IV Continuous <Continuous>    MEDICATIONS  (PRN):  acetaminophen   Tablet .. 650 milliGRAM(s) Oral every 6 hours PRN Temp greater or equal to 38C (100.4F), Mild Pain (1 - 3)  ketorolac   Injectable 15 milliGRAM(s) IV Push every 6 hours PRN Moderate Pain (4 - 6)  morphine  - Injectable 2 milliGRAM(s) IV Push every 4 hours PRN Severe Pain (7 - 10)    REVIEW OF SYSTEMS:  CONSTITUTIONAL: No fever, weight loss, has fatigue  EYES: No eye pain, visual disturbances, or discharge  ENMT:  No difficulty hearing, tinnitus, vertigo; No sinus or throat pain  NECK: No pain or stiffness  RESPIRATORY: No cough, wheezing, chills or hemoptysis; No shortness of breath  CARDIOVASCULAR: No chest pain, palpitations, dizziness, or leg swelling  GASTROINTESTINAL: No abdominal or epigastric pain. No nausea, vomiting, or hematemesis; No diarrhea or constipation. No melena or hematochezia.  GENITOURINARY: No dysuria, frequency, hematuria, or incontinence  NEUROLOGICAL: No headaches, memory loss, loss of strength, numbness, or tremors  SKIN: No itching, burning, rashes, or lesions   ENDOCRINE: No heat or cold intolerance; No hair loss  MUSCULOSKELETAL: Has right sided back pain. No joint pain or swelling; No muscle, or extremity pain  PSYCHIATRIC: No depression, anxiety, mood swings, or difficulty sleeping  HEME/LYMPH: No easy bruising, or bleeding gums  ALLERGY AND IMMUNOLOGIC: No hives or eczema    PHYSICAL EXAM:    T(C): 36.5 (10-13-19 @ 13:30), Max: 36.7 (10-12-19 @ 21:18)  HR: 77 (10-13-19 @ 13:30) (62 - 77)  BP: 144/79 (10-13-19 @ 13:30) (134/75 - 144/79)  RR: 18 (10-13-19 @ 13:30) (16 - 20)  SpO2: 100% (10-13-19 @ 13:30) (98% - 100%)    GENERAL: NAD, well-groomed, well-developed  HEAD:  Atraumatic, Normocephalic  EYES: EOMI, PERRL, conjunctiva and sclera clear  ENMT: No tonsillar erythema, exudates, or enlargement; Moist mucous membranes, Good dentition, No lesions  NECK: Supple, No JVD, Normal thyroid  NERVOUS SYSTEM:  Alert & Oriented X3, Good concentration; Motor Strength 5/5 B/L upper and lower extremities; DTRs 2+ intact and symmetric  CHEST/LUNG: Clear to ascultation bilaterally; No rales, rhonchi, wheezing, or rubs  HEART: Regular rate and rhythm; No murmurs, rubs, or gallops  ABDOMEN: Soft, Nontender, Nondistended; Bowel sounds present  EXTREMITIES:  2+ Peripheral Pulses, No clubbing, cyanosis, or edema  SKIN: No rash      LABS:                        14.2   5.69  )-----------( 321      ( 12 Oct 2019 06:53 )             40.8     10-12    138  |  106  |  11  ----------------------------<  155<H>  3.9   |  26  |  0.73    Ca    9.4      12 Oct 2019 06:53  Phos  5.0     10-12  Mg     2.1     10-12    TPro  7.3  /  Alb  3.8  /  TBili  2.0<H>  /  DBili  0.4<H>  /  AST  18  /  ALT  26  /  AlkPhos  88  10-12        CAPILLARY BLOOD GLUCOSE    POCT Blood Glucose.: 130 mg/dL (13 Oct 2019 11:59)  POCT Blood Glucose.: 131 mg/dL (13 Oct 2019 08:00)      RADIOLOGY & ADDITIONAL TESTS:    Consultant(s) Notes Reviewed:  [x] YES  [ ] NO    Care Discussed with Consultants/Other Providers [x] YES  [ ] NO

## 2019-10-14 DIAGNOSIS — Z02.9 ENCOUNTER FOR ADMINISTRATIVE EXAMINATIONS, UNSPECIFIED: ICD-10-CM

## 2019-10-14 DIAGNOSIS — N28.1 CYST OF KIDNEY, ACQUIRED: ICD-10-CM

## 2019-10-14 DIAGNOSIS — K76.0 FATTY (CHANGE OF) LIVER, NOT ELSEWHERE CLASSIFIED: ICD-10-CM

## 2019-10-14 DIAGNOSIS — E11.9 TYPE 2 DIABETES MELLITUS WITHOUT COMPLICATIONS: ICD-10-CM

## 2019-10-14 DIAGNOSIS — K83.8 OTHER SPECIFIED DISEASES OF BILIARY TRACT: ICD-10-CM

## 2019-10-14 DIAGNOSIS — Z71.89 OTHER SPECIFIED COUNSELING: ICD-10-CM

## 2019-10-14 LAB
ALBUMIN SERPL ELPH-MCNC: 3.5 G/DL — SIGNIFICANT CHANGE UP (ref 3.5–5)
ALP SERPL-CCNC: 92 U/L — SIGNIFICANT CHANGE UP (ref 40–120)
ALT FLD-CCNC: 47 U/L DA — SIGNIFICANT CHANGE UP (ref 10–60)
ANION GAP SERPL CALC-SCNC: 7 MMOL/L — SIGNIFICANT CHANGE UP (ref 5–17)
AST SERPL-CCNC: 32 U/L — SIGNIFICANT CHANGE UP (ref 10–40)
BASOPHILS # BLD AUTO: 0.04 K/UL — SIGNIFICANT CHANGE UP (ref 0–0.2)
BASOPHILS NFR BLD AUTO: 0.6 % — SIGNIFICANT CHANGE UP (ref 0–2)
BILIRUB SERPL-MCNC: 1.4 MG/DL — HIGH (ref 0.2–1.2)
BUN SERPL-MCNC: 9 MG/DL — SIGNIFICANT CHANGE UP (ref 7–18)
CALCIUM SERPL-MCNC: 8.8 MG/DL — SIGNIFICANT CHANGE UP (ref 8.4–10.5)
CHLORIDE SERPL-SCNC: 106 MMOL/L — SIGNIFICANT CHANGE UP (ref 96–108)
CO2 SERPL-SCNC: 26 MMOL/L — SIGNIFICANT CHANGE UP (ref 22–31)
CREAT SERPL-MCNC: 0.76 MG/DL — SIGNIFICANT CHANGE UP (ref 0.5–1.3)
EOSINOPHIL # BLD AUTO: 0.22 K/UL — SIGNIFICANT CHANGE UP (ref 0–0.5)
EOSINOPHIL NFR BLD AUTO: 3.2 % — SIGNIFICANT CHANGE UP (ref 0–6)
GLUCOSE BLDC GLUCOMTR-MCNC: 120 MG/DL — HIGH (ref 70–99)
GLUCOSE BLDC GLUCOMTR-MCNC: 135 MG/DL — HIGH (ref 70–99)
GLUCOSE BLDC GLUCOMTR-MCNC: 135 MG/DL — HIGH (ref 70–99)
GLUCOSE SERPL-MCNC: 137 MG/DL — HIGH (ref 70–99)
HCT VFR BLD CALC: 40.1 % — SIGNIFICANT CHANGE UP (ref 39–50)
HGB BLD-MCNC: 13.6 G/DL — SIGNIFICANT CHANGE UP (ref 13–17)
IMM GRANULOCYTES NFR BLD AUTO: 0.3 % — SIGNIFICANT CHANGE UP (ref 0–1.5)
LYMPHOCYTES # BLD AUTO: 1.7 K/UL — SIGNIFICANT CHANGE UP (ref 1–3.3)
LYMPHOCYTES # BLD AUTO: 25.1 % — SIGNIFICANT CHANGE UP (ref 13–44)
MCHC RBC-ENTMCNC: 29.9 PG — SIGNIFICANT CHANGE UP (ref 27–34)
MCHC RBC-ENTMCNC: 33.9 GM/DL — SIGNIFICANT CHANGE UP (ref 32–36)
MCV RBC AUTO: 88.1 FL — SIGNIFICANT CHANGE UP (ref 80–100)
MONOCYTES # BLD AUTO: 0.43 K/UL — SIGNIFICANT CHANGE UP (ref 0–0.9)
MONOCYTES NFR BLD AUTO: 6.3 % — SIGNIFICANT CHANGE UP (ref 2–14)
NEUTROPHILS # BLD AUTO: 4.37 K/UL — SIGNIFICANT CHANGE UP (ref 1.8–7.4)
NEUTROPHILS NFR BLD AUTO: 64.5 % — SIGNIFICANT CHANGE UP (ref 43–77)
NRBC # BLD: 0 /100 WBCS — SIGNIFICANT CHANGE UP (ref 0–0)
PLATELET # BLD AUTO: 334 K/UL — SIGNIFICANT CHANGE UP (ref 150–400)
POTASSIUM SERPL-MCNC: 3.7 MMOL/L — SIGNIFICANT CHANGE UP (ref 3.5–5.3)
POTASSIUM SERPL-SCNC: 3.7 MMOL/L — SIGNIFICANT CHANGE UP (ref 3.5–5.3)
PROT SERPL-MCNC: 7.1 G/DL — SIGNIFICANT CHANGE UP (ref 6–8.3)
RBC # BLD: 4.55 M/UL — SIGNIFICANT CHANGE UP (ref 4.2–5.8)
RBC # FLD: 11.6 % — SIGNIFICANT CHANGE UP (ref 10.3–14.5)
SODIUM SERPL-SCNC: 139 MMOL/L — SIGNIFICANT CHANGE UP (ref 135–145)
WBC # BLD: 6.78 K/UL — SIGNIFICANT CHANGE UP (ref 3.8–10.5)
WBC # FLD AUTO: 6.78 K/UL — SIGNIFICANT CHANGE UP (ref 3.8–10.5)

## 2019-10-14 PROCEDURE — 76775 US EXAM ABDO BACK WALL LIM: CPT | Mod: 26

## 2019-10-14 RX ORDER — PANTOPRAZOLE SODIUM 20 MG/1
40 TABLET, DELAYED RELEASE ORAL
Refills: 0 | Status: DISCONTINUED | OUTPATIENT
Start: 2019-10-14 | End: 2019-10-15

## 2019-10-14 RX ADMIN — Medication 15 MILLIGRAM(S): at 04:16

## 2019-10-14 RX ADMIN — MORPHINE SULFATE 2 MILLIGRAM(S): 50 CAPSULE, EXTENDED RELEASE ORAL at 20:10

## 2019-10-14 RX ADMIN — Medication 15 MILLIGRAM(S): at 12:38

## 2019-10-14 RX ADMIN — MORPHINE SULFATE 2 MILLIGRAM(S): 50 CAPSULE, EXTENDED RELEASE ORAL at 03:09

## 2019-10-14 RX ADMIN — MORPHINE SULFATE 2 MILLIGRAM(S): 50 CAPSULE, EXTENDED RELEASE ORAL at 14:57

## 2019-10-14 RX ADMIN — PANTOPRAZOLE SODIUM 40 MILLIGRAM(S): 20 TABLET, DELAYED RELEASE ORAL at 14:58

## 2019-10-14 RX ADMIN — Medication 15 MILLIGRAM(S): at 10:04

## 2019-10-14 RX ADMIN — Medication 15 MILLIGRAM(S): at 03:12

## 2019-10-14 RX ADMIN — METFORMIN HYDROCHLORIDE 500 MILLIGRAM(S): 850 TABLET ORAL at 18:08

## 2019-10-14 RX ADMIN — MORPHINE SULFATE 2 MILLIGRAM(S): 50 CAPSULE, EXTENDED RELEASE ORAL at 20:40

## 2019-10-14 RX ADMIN — MORPHINE SULFATE 2 MILLIGRAM(S): 50 CAPSULE, EXTENDED RELEASE ORAL at 00:30

## 2019-10-14 RX ADMIN — MORPHINE SULFATE 2 MILLIGRAM(S): 50 CAPSULE, EXTENDED RELEASE ORAL at 15:12

## 2019-10-14 RX ADMIN — METFORMIN HYDROCHLORIDE 500 MILLIGRAM(S): 850 TABLET ORAL at 05:47

## 2019-10-14 NOTE — CONSULT NOTE ADULT - SUBJECTIVE AND OBJECTIVE BOX
HPI:  43 yo M with no significant PMHx presented to ED complaining RUQ abdominal pain. Pain started 3 weeks ago, constant, radiating to back, initially was mild but has worsened to severe pain in last 1 week, sharp in nature, not associated with eating. He has nausea and that these symptoms never happened before. Pt denies drinking or any surgical history. Pt denies vomiting, diarrhea, fevers, or any other complaints.    Urology consulted for incidental finding of 1.4cm right renal cyst. Pt denies dysuria, hematuria, increased urinary frequency. Admits to occasional urinary hesitancy. States he's had RUQ abd pain that radiates to the back for a few months that has been mild but progressively worsening over the past few weeks. Admits to nausea, no vomiting. Pain relieved with morphine but returns once morphine wears off. Admits to 30lb weight loss over the past year. Abd pain not worsened with food. Admits to diarrhea that started at midnight.       Allergies: No Known Allergies  Surgical hx: None      Vitals: T(F): 98.3 (10-14-19 @ 12:28), Max: 98.3 (10-14-19 @ 12:28)  HR: 73 (10-14-19 @ 12:28) (71 - 79)  BP: 128/78 (10-14-19 @ 12:28) (127/81 - 128/78)  RR: 18 (10-14-19 @ 12:28) (16 - 18)  SpO2: 99% (10-14-19 @ 12:28) (98% - 99%)      Labs:                         13.6   6.78  )-----------( 334      ( 14 Oct 2019 06:17 )             40.1   10-14    139  |  106  |  9   ----------------------------<  137<H>  3.7   |  26  |  0.76    Ca    8.8      14 Oct 2019 06:17    TPro  7.1  /  Alb  3.5  /  TBili  1.4<H>  /  DBili  x   /  AST  32  /  ALT  47  /  AlkPhos  92  10-14      Physical Exam:  General: AAO x 3, No acute distress  Resp: Nonlabored respirations on room air  Abdomen: soft, nondistended, tender to RUQ, tender to right flank    < from: CT Abdomen and Pelvis w/ IV Cont (10.12.19 @ 15:22) >  FINDINGS:    LOWER CHEST: There is a calcified granuloma in the left lower lobe and a   5 mm noncalcified nodule in the left lower lobe along with a cluster of   small nodules in the left lower lobe.    ABDOMEN:  LIVER: within normal limits.  BILE DUCTS: normal caliber.  GALLBLADDER: No calcified gallstones. Normal caliber wall.  PANCREAS: within normal limits.  SPLEEN:within normal limits.  ADRENALS: within normal limits.  KIDNEYS: There is a 0.8 cm hypodensity in the lower pole of the right   kidney.    PELVIS:  REPRODUCTIVE ORGANS: Prostate gland is enlarged to 4.5 cm in transverse   dimension.  URETERS: within normal limits.  BLADDER: within normal limits.      BOWEL: Normal caliber. No enlarged mesenteric lymph nodes.  PERITONEUM: no ascites or free air, no fluid collection.  VESSELS: within normal limits.  RETROPERITONEUM: within normal limits.    ABDOMINAL WALL: within normal limits.  BONES: Mild anterior wedging deformity of the T11 vertebral body.    IMPRESSION: No CT findings to explain the patient's abdominal pain.    Left lower lobe lung nodules. Follow-up chest CT is recommended in 3   months.    < end of copied text >    < from: US Renal (10.14.19 @ 09:55) >  IMPRESSION:     No evidence for bilateral hydronephrosis. 1.4 cm cyst lower pole region   right kidney.    < end of copied text > HPI:  43 yo M with no significant PMHx presented to ED complaining RUQ abdominal pain. Pain started 3 weeks ago, constant, radiating to back, initially was mild but has worsened to severe pain in last 1 week, sharp in nature, not associated with eating. He has nausea and that these symptoms never happened before. Pt denies drinking or any surgical history. Pt denies vomiting, diarrhea, fevers, or any other complaints.    Urology consulted for incidental finding of 1.4cm right renal cyst. Pt denies dysuria, hematuria, increased urinary frequency. Admits to occasional urinary hesitancy. States he's had RUQ abd pain that radiates to the back for a few months that has been mild but progressively worsening over the past few weeks. Admits to nausea, no vomiting. Pain relieved with morphine but returns once morphine wears off. Admits to 30lb weight loss over the past year. Abd pain not worsened with food. Admits to diarrhea that started at midnight. No specific timing to his symptoms. No aggravating or alleviating factors that he knows of.    PMH: None  PSH: None  Family History: No family history of  malignancy  Social History: Does not smoke    ROS: All others negative except as noted above.    Meds: None  Allergies: No Known Allergies        Vitals: T(F): 98.3 (10-14-19 @ 12:28), Max: 98.3 (10-14-19 @ 12:28)  HR: 73 (10-14-19 @ 12:28) (71 - 79)  BP: 128/78 (10-14-19 @ 12:28) (127/81 - 128/78)  RR: 18 (10-14-19 @ 12:28) (16 - 18)  SpO2: 99% (10-14-19 @ 12:28) (98% - 99%)      Labs:                         13.6   6.78  )-----------( 334      ( 14 Oct 2019 06:17 )             40.1   10-14    139  |  106  |  9   ----------------------------<  137<H>  3.7   |  26  |  0.76    Ca    8.8      14 Oct 2019 06:17    TPro  7.1  /  Alb  3.5  /  TBili  1.4<H>  /  DBili  x   /  AST  32  /  ALT  47  /  AlkPhos  92  10-14      Physical Exam:  General: AAO x 3, No acute distress  Resp: Nonlabored respirations on room air  Abdomen: soft, nondistended, tender to RUQ, tender to right flank    < from: CT Abdomen and Pelvis w/ IV Cont (10.12.19 @ 15:22) >  FINDINGS:    LOWER CHEST: There is a calcified granuloma in the left lower lobe and a   5 mm noncalcified nodule in the left lower lobe along with a cluster of   small nodules in the left lower lobe.    ABDOMEN:  LIVER: within normal limits.  BILE DUCTS: normal caliber.  GALLBLADDER: No calcified gallstones. Normal caliber wall.  PANCREAS: within normal limits.  SPLEEN:within normal limits.  ADRENALS: within normal limits.  KIDNEYS: There is a 0.8 cm hypodensity in the lower pole of the right   kidney.    PELVIS:  REPRODUCTIVE ORGANS: Prostate gland is enlarged to 4.5 cm in transverse   dimension.  URETERS: within normal limits.  BLADDER: within normal limits.      BOWEL: Normal caliber. No enlarged mesenteric lymph nodes.  PERITONEUM: no ascites or free air, no fluid collection.  VESSELS: within normal limits.  RETROPERITONEUM: within normal limits.    ABDOMINAL WALL: within normal limits.  BONES: Mild anterior wedging deformity of the T11 vertebral body.    IMPRESSION: No CT findings to explain the patient's abdominal pain.    Left lower lobe lung nodules. Follow-up chest CT is recommended in 3   months.    < end of copied text >    < from: US Renal (10.14.19 @ 09:55) >  IMPRESSION:     No evidence for bilateral hydronephrosis. 1.4 cm cyst lower pole region   right kidney.    < end of copied text >

## 2019-10-14 NOTE — CONSULT NOTE ADULT - ASSESSMENT
44M admitted for RUQ pain, incidental finding of 1.4cm renal cyst on R kidney    - No inpatient urologic intervention at this time, pt can follow up with Dr. Shah in the office       Discussed w Dr. Shah 44M admitted for RUQ pain, incidental finding of 1.4cm renal cyst on R kidney  - labs reviewed  - CT images reviewed  - No inpatient urologic intervention at this time, pt can follow up with Dr. Shah in the office for surveillance of cyst  - Discussed with PA staff

## 2019-10-14 NOTE — PROGRESS NOTE ADULT - PROBLEM SELECTOR PLAN 1
w/u done -- CT A/P, liver and reanl sono, MRCP   c/w pain meds  f/u with GI and urology - if GI and urology clear pt, pt can be discharged

## 2019-10-14 NOTE — PROGRESS NOTE ADULT - ATTENDING COMMENTS
Gia Fish MD
Gia Fish MD
Patient seen and examined. Patient's history, vitals, labs, imaging studies reviewed. Discussed with NP, agree with note with edits. Plan of care discussed with patient, and agrees, all questions answered.   Gia Fish MD  10/14/2019

## 2019-10-14 NOTE — PROGRESS NOTE ADULT - PROBLEM SELECTOR PLAN 4
per MRCP  / David called  total bilirubin trending down - 1.4 today ( 2.0 for past 2 days)   monitor labs

## 2019-10-14 NOTE — PROGRESS NOTE ADULT - SUBJECTIVE AND OBJECTIVE BOX
NP Note discussed with  Primary Attending    Patient is a 44y old  Male who presents with a chief complaint of RUQ Pain (13 Oct 2019 14:54)    44 year old male with no significant PMHx presented to ED complaining RUQ abdominal pain. Pain started 3 weeks ago, constant, radiating to back, initially was mild but has worsened to severe pain in last 1 week, sharp in nature, not associated with eating. He has nausea and that these symptoms never happened before. Pt denies drinking or any surgical history. Pt denies vomiting, diarrhea, fevers, or any other complaints. Admitted for RUQ pain.    Liver sono was noted with CBD 5.4mm dilation, so MRCP was performed, CBD measures 4mm without gallstone or biliary duct dilation. Pt states his pain does not related with food, no tenderness on exam either, continuos pain to RUQ per pt. c/w pain meds. Due to Rt kidney cyst, renal sono ordered, pending performance, f/u  urology.   VSs ,afebrile, labs unremarkable.                   INTERVAL HPI/OVERNIGHT EVENTS: no new complaints    MEDICATIONS  (STANDING):  ergocalciferol 30566 Unit(s) Oral <User Schedule>  influenza   Vaccine 0.5 milliLiter(s) IntraMuscular once  metFORMIN 500 milliGRAM(s) Oral two times a day  pantoprazole    Tablet 40 milliGRAM(s) Oral before breakfast  sodium chloride 0.9%. 1000 milliLiter(s) (100 mL/Hr) IV Continuous <Continuous>    MEDICATIONS  (PRN):  acetaminophen   Tablet .. 650 milliGRAM(s) Oral every 6 hours PRN Temp greater or equal to 38C (100.4F), Mild Pain (1 - 3)  ketorolac   Injectable 15 milliGRAM(s) IV Push every 6 hours PRN Moderate Pain (4 - 6)  morphine  - Injectable 2 milliGRAM(s) IV Push every 4 hours PRN Severe Pain (7 - 10)      __________________________________________________  REVIEW OF SYSTEMS:    CONSTITUTIONAL: No fever,   EYES: no acute visual disturbances  NECK: No pain or stiffness  RESPIRATORY: No cough; No shortness of breath  CARDIOVASCULAR: No chest pain, no palpitations  GASTROINTESTINAL: No pain. No nausea or vomiting; No diarrhea   NEUROLOGICAL: No headache or numbness, no tremors  MUSCULOSKELETAL: No joint pain, no muscle pain  GENITOURINARY: no dysuria, no frequency, no hesitancy  PSYCHIATRY: no depression , no anxiety  ALL OTHER  ROS negative        Vital Signs Last 24 Hrs  T(C): 36.6 (14 Oct 2019 05:08), Max: 36.7 (13 Oct 2019 22:34)  T(F): 97.8 (14 Oct 2019 05:08), Max: 98 (13 Oct 2019 22:34)  HR: 71 (14 Oct 2019 05:08) (71 - 79)  BP: 128/73 (14 Oct 2019 05:08) (127/81 - 144/79)  BP(mean): --  RR: 16 (14 Oct 2019 05:08) (16 - 18)  SpO2: 98% (14 Oct 2019 05:08) (98% - 100%)    ________________________________________________  PHYSICAL EXAM:  GENERAL: NAD  HEENT: Normocephalic;  conjunctivae and sclerae clear; moist mucous membranes;   NECK : supple  CHEST/LUNG: Clear to auscultation bilaterally with good air entry   HEART: S1 S2  regular; no murmurs, gallops or rubs  ABDOMEN: Soft, Nontender, Nondistended; Bowel sounds present  EXTREMITIES: no cyanosis; no edema; no calf tenderness  SKIN: warm and dry; no rash  NERVOUS SYSTEM:  Awake and alert; Oriented  to place, person and time ; no new deficits    _________________________________________________  LABS:                        13.6   6.78  )-----------( 334      ( 14 Oct 2019 06:17 )             40.1     10-14    139  |  106  |  9   ----------------------------<  137<H>  3.7   |  26  |  0.76    Ca    8.8      14 Oct 2019 06:17    TPro  7.1  /  Alb  3.5  /  TBili  1.4<H>  /  DBili  x   /  AST  32  /  ALT  47  /  AlkPhos  92  10-14        CAPILLARY BLOOD GLUCOSE      POCT Blood Glucose.: 120 mg/dL (14 Oct 2019 08:03)  POCT Blood Glucose.: 149 mg/dL (13 Oct 2019 21:20)  POCT Blood Glucose.: 119 mg/dL (13 Oct 2019 16:40)  POCT Blood Glucose.: 130 mg/dL (13 Oct 2019 11:59)        RADIOLOGY & ADDITIONAL TESTS:    EXAM:  US KIDNEY(S)                            PROCEDURE DATE:  10/14/2019          INTERPRETATION:  CLINICAL INFORMATION: 0.8 cm hypodensity in the lower   pole region of the right kidney described on CT examination 10/12/2019.    COMPARISON: CT evaluation abdomen/pelvis 10/12/2019, ultrasound   evaluation 10/11/2019 and radiology report of MR evaluation 10/11/2019.    TECHNIQUE: Sonography of the kidneys.     FINDINGS:    Right kidney:  12.6 cm. No hydronephrosis or calculi. A 1.4 x 1.3 x 1.2   cm cyst is identified within the lower pole aspect of the right kidney.    Left kidney:  13.1 cm. No renal mass, hydronephrosis or calculi.    Urinary bladder: Visualized portions appear unremarkable.    The visualized segments of the abdominal aorta and IVC appear   unremarkable caliber.    IMPRESSION:     No evidence for bilateral hydronephrosis. 1.4 cm cyst lower pole region   right kidney.    EXAM:  US LIVER AND PANCREAS                            PROCEDURE DATE:  10/11/2019          INTERPRETATION:  CLINICAL INFORMATION: Right upper quadrant abdominal pain    COMPARISON: None available.    TECHNIQUE: Sonography of the right upper quadrant.     FINDINGS:    Liver: Enlarged. Increased echogenicity.    Bile ducts: Normal caliber. Common bile duct measures 5.4 mm.     Gallbladder: Within normal limits.        Pancreas: Visualized portions are within normal limits.    Right kidney: 12.6 cm. No hydronephrosis. 1.2 cm cyst.    Ascites: None.    IVC: Visualized portions are within normal limits.    IMPRESSION:     Enlarged, fatty liver.  No cholelithiasis or biliary ductal dilatation.                  Imaging Personally Reviewed:  YES    Consultant(s) Notes Reviewed:   No    Care Discussed with Consultants : called CHHAYA Hernandez     Plan of care was discussed with patient and /or primary care giver; all questions and concerns were addressed and care was aligned with patient's wishes. NP Note discussed with  Primary Attending    Patient is a 44 year old  Male who presents with a chief complaint of RUQ Pain (13 Oct 2019 14:54)    44 year old male with no significant PMHx presented to ED complaining RUQ abdominal pain. Pain started 3 weeks ago, constant, radiating to back, initially was mild but has worsened to severe pain in last 1 week, sharp in nature, not associated with eating. He has nausea and that these symptoms never happened before. Pt denies drinking or any surgical history. Pt denies vomiting, diarrhea, fevers, or any other complaints. Admitted for RUQ pain.    Liver sono was noted with CBD 5.4mm dilation, so MRCP was performed, CBD measures 4mm without gallstone or biliary duct dilation. Pt states his pain does not related with food, no tenderness on exam either, continuos pain to RUQ per pt. c/w pain meds. Due to Rt kidney cyst, renal sono ordered, pending performance, f/u  urology.   VSs ,afebrile, labs unremarkable.         INTERVAL HPI/OVERNIGHT EVENTS: no new complaints    MEDICATIONS  (STANDING):  ergocalciferol 02857 Unit(s) Oral <User Schedule>  influenza   Vaccine 0.5 milliLiter(s) IntraMuscular once  metFORMIN 500 milliGRAM(s) Oral two times a day  pantoprazole    Tablet 40 milliGRAM(s) Oral before breakfast  sodium chloride 0.9%. 1000 milliLiter(s) (100 mL/Hr) IV Continuous <Continuous>    MEDICATIONS  (PRN):  acetaminophen   Tablet .. 650 milliGRAM(s) Oral every 6 hours PRN Temp greater or equal to 38C (100.4F), Mild Pain (1 - 3)  ketorolac   Injectable 15 milliGRAM(s) IV Push every 6 hours PRN Moderate Pain (4 - 6)  morphine  - Injectable 2 milliGRAM(s) IV Push every 4 hours PRN Severe Pain (7 - 10)      __________________________________________________  REVIEW OF SYSTEMS:    CONSTITUTIONAL: No fever,   EYES: no acute visual disturbances  NECK: No pain or stiffness  RESPIRATORY: No cough; No shortness of breath  CARDIOVASCULAR: No chest pain, no palpitations  GASTROINTESTINAL: No pain. No nausea or vomiting; No diarrhea   NEUROLOGICAL: No headache or numbness, no tremors  MUSCULOSKELETAL: No joint pain, no muscle pain  GENITOURINARY: no dysuria, no frequency, no hesitancy  PSYCHIATRY: no depression , no anxiety  ALL OTHER  ROS negative        Vital Signs Last 24 Hrs  T(C): 36.6 (14 Oct 2019 05:08), Max: 36.7 (13 Oct 2019 22:34)  T(F): 97.8 (14 Oct 2019 05:08), Max: 98 (13 Oct 2019 22:34)  HR: 71 (14 Oct 2019 05:08) (71 - 79)  BP: 128/73 (14 Oct 2019 05:08) (127/81 - 144/79)  RR: 16 (14 Oct 2019 05:08) (16 - 18)  SpO2: 98% (14 Oct 2019 05:08) (98% - 100%)    ________________________________________________  PHYSICAL EXAM:  GENERAL: NAD  HEENT: Normocephalic;  conjunctivae and sclerae clear; moist mucous membranes;   NECK : supple  CHEST/LUNG: Clear to auscultation bilaterally with good air entry   HEART: S1 S2  regular; no murmurs, gallops or rubs  ABDOMEN: Soft, Nontender, Nondistended; Bowel sounds present  EXTREMITIES: no cyanosis; no edema; no calf tenderness  SKIN: warm and dry; no rash  NERVOUS SYSTEM:  Awake and alert; Oriented  to place, person and time ; no new deficits    _________________________________________________  LABS:                        13.6   6.78  )-----------( 334      ( 14 Oct 2019 06:17 )             40.1     10-14    139  |  106  |  9   ----------------------------<  137<H>  3.7   |  26  |  0.76    Ca    8.8      14 Oct 2019 06:17    TPro  7.1  /  Alb  3.5  /  TBili  1.4<H>  /  DBili  x   /  AST  32  /  ALT  47  /  AlkPhos  92  10-14        CAPILLARY BLOOD GLUCOSE      POCT Blood Glucose.: 120 mg/dL (14 Oct 2019 08:03)  POCT Blood Glucose.: 149 mg/dL (13 Oct 2019 21:20)  POCT Blood Glucose.: 119 mg/dL (13 Oct 2019 16:40)  POCT Blood Glucose.: 130 mg/dL (13 Oct 2019 11:59)        RADIOLOGY & ADDITIONAL TESTS:    EXAM:  US KIDNEY(S)                            PROCEDURE DATE:  10/14/2019          INTERPRETATION:  CLINICAL INFORMATION: 0.8 cm hypodensity in the lower   pole region of the right kidney described on CT examination 10/12/2019.    COMPARISON: CT evaluation abdomen/pelvis 10/12/2019, ultrasound   evaluation 10/11/2019 and radiology report of MR evaluation 10/11/2019.    TECHNIQUE: Sonography of the kidneys.     FINDINGS:    Right kidney:  12.6 cm. No hydronephrosis or calculi. A 1.4 x 1.3 x 1.2   cm cyst is identified within the lower pole aspect of the right kidney.    Left kidney:  13.1 cm. No renal mass, hydronephrosis or calculi.    Urinary bladder: Visualized portions appear unremarkable.    The visualized segments of the abdominal aorta and IVC appear   unremarkable caliber.    IMPRESSION:     No evidence for bilateral hydronephrosis. 1.4 cm cyst lower pole region   right kidney.    EXAM:  US LIVER AND PANCREAS                            PROCEDURE DATE:  10/11/2019          INTERPRETATION:  CLINICAL INFORMATION: Right upper quadrant abdominal pain    COMPARISON: None available.    TECHNIQUE: Sonography of the right upper quadrant.     FINDINGS:    Liver: Enlarged. Increased echogenicity.    Bile ducts: Normal caliber. Common bile duct measures 5.4 mm.     Gallbladder: Within normal limits.        Pancreas: Visualized portions are within normal limits.    Right kidney: 12.6 cm. No hydronephrosis. 1.2 cm cyst.    Ascites: None.    IVC: Visualized portions are within normal limits.    IMPRESSION:     Enlarged, fatty liver.  No cholelithiasis or biliary ductal dilatation.          Consultant(s) Notes Reviewed:   No    Care Discussed with Consultants : called CHHAYA Hernandez     Plan of care was discussed with patient and /or primary care giver; all questions and concerns were addressed and care was aligned with patient's wishes. NP Note discussed with  Primary Attending    Patient is a 44 year old  Male who presents with a chief complaint of RUQ Pain (13 Oct 2019 14:54)    44 year old male with no significant PMHx presented to ED complaining RUQ abdominal pain. Pain started 3 weeks ago, constant, radiating to back, initially was mild but has worsened to severe pain in last 1 week, sharp in nature, not associated with eating. He has nausea and that these symptoms never happened before. Pt denies drinking or any surgical history. Pt denies vomiting, diarrhea, fevers, or any other complaints. Admitted for RUQ pain.    Liver sono was noted with CBD 5.4mm dilation, so MRCP was performed, CBD measures 4mm without gallstone or biliary duct dilation. Pt states his pain does not related with food, no tenderness on exam either, continuos pain to RUQ per pt. c/w pain meds. Due to Rt kidney cyst, renal sono ordered, pending performance, f/u  urology.   VSs ,afebrile, labs unremarkable.         INTERVAL HPI/OVERNIGHT EVENTS: no new complaints    MEDICATIONS  (STANDING):  ergocalciferol 47771 Unit(s) Oral <User Schedule>  influenza   Vaccine 0.5 milliLiter(s) IntraMuscular once  metFORMIN 500 milliGRAM(s) Oral two times a day  pantoprazole    Tablet 40 milliGRAM(s) Oral before breakfast  sodium chloride 0.9%. 1000 milliLiter(s) (100 mL/Hr) IV Continuous <Continuous>    MEDICATIONS  (PRN):  acetaminophen   Tablet .. 650 milliGRAM(s) Oral every 6 hours PRN Temp greater or equal to 38C (100.4F), Mild Pain (1 - 3)  ketorolac   Injectable 15 milliGRAM(s) IV Push every 6 hours PRN Moderate Pain (4 - 6)  morphine  - Injectable 2 milliGRAM(s) IV Push every 4 hours PRN Severe Pain (7 - 10)      __________________________________________________  REVIEW OF SYSTEMS:    CONSTITUTIONAL: No fever,   EYES: no acute visual disturbances  NECK: No pain or stiffness  RESPIRATORY: No cough; No shortness of breath  CARDIOVASCULAR: No chest pain, no palpitations  GASTROINTESTINAL: No pain. No nausea or vomiting; No diarrhea   NEUROLOGICAL: No headache or numbness, no tremors  MUSCULOSKELETAL: No joint pain, no muscle pain  GENITOURINARY: no dysuria, no frequency, no hesitancy  PSYCHIATRY: no depression, no anxiety  ALL OTHER  ROS negative        Vital Signs Last 24 Hrs  T(C): 36.6 (14 Oct 2019 05:08), Max: 36.7 (13 Oct 2019 22:34)  T(F): 97.8 (14 Oct 2019 05:08), Max: 98 (13 Oct 2019 22:34)  HR: 71 (14 Oct 2019 05:08) (71 - 79)  BP: 128/73 (14 Oct 2019 05:08) (127/81 - 144/79)  RR: 16 (14 Oct 2019 05:08) (16 - 18)  SpO2: 98% (14 Oct 2019 05:08) (98% - 100%)    ________________________________________________  PHYSICAL EXAM:  GENERAL: NAD  HEENT: Normocephalic;  conjunctivae and sclerae clear; moist mucous membranes;   NECK : supple  CHEST/LUNG: Clear to auscultation bilaterally with good air entry   HEART: S1 S2  regular; no murmurs, gallops or rubs  ABDOMEN: Soft, Nontender, Nondistended; Bowel sounds present  EXTREMITIES: no cyanosis; no edema; no calf tenderness  SKIN: warm and dry; no rash  NERVOUS SYSTEM:  Awake and alert; Oriented  to place, person and time; no new deficits    _________________________________________________  LABS:                        13.6   6.78  )-----------( 334      ( 14 Oct 2019 06:17 )             40.1     10-14    139  |  106  |  9   ----------------------------<  137<H>  3.7   |  26  |  0.76    Ca    8.8      14 Oct 2019 06:17    TPro  7.1  /  Alb  3.5  /  TBili  1.4<H>  /  DBili  x   /  AST  32  /  ALT  47  /  AlkPhos  92  10-14        CAPILLARY BLOOD GLUCOSE  POCT Blood Glucose.: 120 mg/dL (14 Oct 2019 08:03)  POCT Blood Glucose.: 149 mg/dL (13 Oct 2019 21:20)  POCT Blood Glucose.: 119 mg/dL (13 Oct 2019 16:40)  POCT Blood Glucose.: 130 mg/dL (13 Oct 2019 11:59)        RADIOLOGY & ADDITIONAL TESTS:    EXAM:  US KIDNEY(S)                            PROCEDURE DATE:  10/14/2019          INTERPRETATION:  CLINICAL INFORMATION: 0.8 cm hypodensity in the lower   pole region of the right kidney described on CT examination 10/12/2019.    COMPARISON: CT evaluation abdomen/pelvis 10/12/2019, ultrasound   evaluation 10/11/2019 and radiology report of MR evaluation 10/11/2019.    TECHNIQUE: Sonography of the kidneys.     FINDINGS:    Right kidney:  12.6 cm. No hydronephrosis or calculi. A 1.4 x 1.3 x 1.2   cm cyst is identified within the lower pole aspect of the right kidney.    Left kidney:  13.1 cm. No renal mass, hydronephrosis or calculi.    Urinary bladder: Visualized portions appear unremarkable.    The visualized segments of the abdominal aorta and IVC appear   unremarkable caliber.    IMPRESSION:     No evidence for bilateral hydronephrosis. 1.4 cm cyst lower pole region   right kidney.    EXAM:  US LIVER AND PANCREAS                            PROCEDURE DATE:  10/11/2019          INTERPRETATION:  CLINICAL INFORMATION: Right upper quadrant abdominal pain    COMPARISON: None available.    TECHNIQUE: Sonography of the right upper quadrant.     FINDINGS:    Liver: Enlarged. Increased echogenicity.    Bile ducts: Normal caliber. Common bile duct measures 5.4 mm.     Gallbladder: Within normal limits.        Pancreas: Visualized portions are within normal limits.    Right kidney: 12.6 cm. No hydronephrosis. 1.2 cm cyst.    Ascites: None.    IVC: Visualized portions are within normal limits.    IMPRESSION:     Enlarged, fatty liver.  No cholelithiasis or biliary ductal dilatation.          Consultant(s) Notes Reviewed:   YES    Care Discussed with Consultants : called CHHAYA Hernandez     Plan of care was discussed with patient and /or primary care giver; all questions and concerns were addressed and care was aligned with patient's wishes.

## 2019-10-15 VITALS
DIASTOLIC BLOOD PRESSURE: 93 MMHG | TEMPERATURE: 97 F | RESPIRATION RATE: 18 BRPM | OXYGEN SATURATION: 100 % | SYSTOLIC BLOOD PRESSURE: 153 MMHG | HEART RATE: 68 BPM

## 2019-10-15 LAB
ALBUMIN SERPL ELPH-MCNC: 4 G/DL — SIGNIFICANT CHANGE UP (ref 3.5–5)
ALP SERPL-CCNC: 95 U/L — SIGNIFICANT CHANGE UP (ref 40–120)
ALT FLD-CCNC: 41 U/L DA — SIGNIFICANT CHANGE UP (ref 10–60)
ANION GAP SERPL CALC-SCNC: 5 MMOL/L — SIGNIFICANT CHANGE UP (ref 5–17)
AST SERPL-CCNC: 18 U/L — SIGNIFICANT CHANGE UP (ref 10–40)
BILIRUB SERPL-MCNC: 1.7 MG/DL — HIGH (ref 0.2–1.2)
BUN SERPL-MCNC: 7 MG/DL — SIGNIFICANT CHANGE UP (ref 7–18)
CALCIUM SERPL-MCNC: 9.2 MG/DL — SIGNIFICANT CHANGE UP (ref 8.4–10.5)
CHLORIDE SERPL-SCNC: 103 MMOL/L — SIGNIFICANT CHANGE UP (ref 96–108)
CO2 SERPL-SCNC: 29 MMOL/L — SIGNIFICANT CHANGE UP (ref 22–31)
CREAT SERPL-MCNC: 0.73 MG/DL — SIGNIFICANT CHANGE UP (ref 0.5–1.3)
GLUCOSE BLDC GLUCOMTR-MCNC: 121 MG/DL — HIGH (ref 70–99)
GLUCOSE SERPL-MCNC: 130 MG/DL — HIGH (ref 70–99)
HCT VFR BLD CALC: 42.2 % — SIGNIFICANT CHANGE UP (ref 39–50)
HGB BLD-MCNC: 14.5 G/DL — SIGNIFICANT CHANGE UP (ref 13–17)
MCHC RBC-ENTMCNC: 30.1 PG — SIGNIFICANT CHANGE UP (ref 27–34)
MCHC RBC-ENTMCNC: 34.4 GM/DL — SIGNIFICANT CHANGE UP (ref 32–36)
MCV RBC AUTO: 87.6 FL — SIGNIFICANT CHANGE UP (ref 80–100)
NRBC # BLD: 0 /100 WBCS — SIGNIFICANT CHANGE UP (ref 0–0)
PLATELET # BLD AUTO: 353 K/UL — SIGNIFICANT CHANGE UP (ref 150–400)
POTASSIUM SERPL-MCNC: 3.6 MMOL/L — SIGNIFICANT CHANGE UP (ref 3.5–5.3)
POTASSIUM SERPL-SCNC: 3.6 MMOL/L — SIGNIFICANT CHANGE UP (ref 3.5–5.3)
PROT SERPL-MCNC: 7.7 G/DL — SIGNIFICANT CHANGE UP (ref 6–8.3)
RBC # BLD: 4.82 M/UL — SIGNIFICANT CHANGE UP (ref 4.2–5.8)
RBC # FLD: 11.5 % — SIGNIFICANT CHANGE UP (ref 10.3–14.5)
SODIUM SERPL-SCNC: 137 MMOL/L — SIGNIFICANT CHANGE UP (ref 135–145)
WBC # BLD: 5.73 K/UL — SIGNIFICANT CHANGE UP (ref 3.8–10.5)
WBC # FLD AUTO: 5.73 K/UL — SIGNIFICANT CHANGE UP (ref 3.8–10.5)

## 2019-10-15 PROCEDURE — 99285 EMERGENCY DEPT VISIT HI MDM: CPT | Mod: 25

## 2019-10-15 PROCEDURE — 96374 THER/PROPH/DIAG INJ IV PUSH: CPT | Mod: XU

## 2019-10-15 PROCEDURE — 36415 COLL VENOUS BLD VENIPUNCTURE: CPT

## 2019-10-15 PROCEDURE — 99222 1ST HOSP IP/OBS MODERATE 55: CPT

## 2019-10-15 PROCEDURE — 83735 ASSAY OF MAGNESIUM: CPT

## 2019-10-15 PROCEDURE — 82962 GLUCOSE BLOOD TEST: CPT

## 2019-10-15 PROCEDURE — 74177 CT ABD & PELVIS W/CONTRAST: CPT

## 2019-10-15 PROCEDURE — 83690 ASSAY OF LIPASE: CPT

## 2019-10-15 PROCEDURE — 83036 HEMOGLOBIN GLYCOSYLATED A1C: CPT

## 2019-10-15 PROCEDURE — 84100 ASSAY OF PHOSPHORUS: CPT

## 2019-10-15 PROCEDURE — 82306 VITAMIN D 25 HYDROXY: CPT

## 2019-10-15 PROCEDURE — 76775 US EXAM ABDO BACK WALL LIM: CPT

## 2019-10-15 PROCEDURE — 99238 HOSP IP/OBS DSCHRG MGMT 30/<: CPT

## 2019-10-15 PROCEDURE — 74181 MRI ABDOMEN W/O CONTRAST: CPT

## 2019-10-15 PROCEDURE — 80053 COMPREHEN METABOLIC PANEL: CPT

## 2019-10-15 PROCEDURE — 82248 BILIRUBIN DIRECT: CPT

## 2019-10-15 PROCEDURE — 82746 ASSAY OF FOLIC ACID SERUM: CPT

## 2019-10-15 PROCEDURE — 80061 LIPID PANEL: CPT

## 2019-10-15 PROCEDURE — 82607 VITAMIN B-12: CPT

## 2019-10-15 PROCEDURE — 85027 COMPLETE CBC AUTOMATED: CPT

## 2019-10-15 PROCEDURE — 76705 ECHO EXAM OF ABDOMEN: CPT

## 2019-10-15 PROCEDURE — 84443 ASSAY THYROID STIM HORMONE: CPT

## 2019-10-15 RX ORDER — PANTOPRAZOLE SODIUM 20 MG/1
1 TABLET, DELAYED RELEASE ORAL
Qty: 30 | Refills: 0
Start: 2019-10-15 | End: 2019-11-13

## 2019-10-15 RX ORDER — METFORMIN HYDROCHLORIDE 850 MG/1
1 TABLET ORAL
Qty: 60 | Refills: 0
Start: 2019-10-15 | End: 2019-11-13

## 2019-10-15 RX ORDER — ERGOCALCIFEROL 1.25 MG/1
1 CAPSULE ORAL
Qty: 4 | Refills: 0
Start: 2019-10-15 | End: 2019-11-13

## 2019-10-15 RX ORDER — ACETAMINOPHEN 500 MG
2 TABLET ORAL
Qty: 112 | Refills: 0
Start: 2019-10-15 | End: 2019-10-28

## 2019-10-15 RX ORDER — LIDOCAINE, MENTHOL, AND METHYL SALICYLATE 4; .5; .1 MG/100MG; MG/100MG; MG/100MG
1 PATCH TOPICAL
Qty: 14 | Refills: 0
Start: 2019-10-15 | End: 2019-10-28

## 2019-10-15 RX ADMIN — Medication 15 MILLIGRAM(S): at 05:28

## 2019-10-15 RX ADMIN — METFORMIN HYDROCHLORIDE 500 MILLIGRAM(S): 850 TABLET ORAL at 05:26

## 2019-10-15 RX ADMIN — Medication 650 MILLIGRAM(S): at 10:50

## 2019-10-15 RX ADMIN — Medication 15 MILLIGRAM(S): at 06:00

## 2019-10-15 RX ADMIN — MORPHINE SULFATE 2 MILLIGRAM(S): 50 CAPSULE, EXTENDED RELEASE ORAL at 00:54

## 2019-10-15 RX ADMIN — MORPHINE SULFATE 2 MILLIGRAM(S): 50 CAPSULE, EXTENDED RELEASE ORAL at 01:24

## 2019-10-15 RX ADMIN — PANTOPRAZOLE SODIUM 40 MILLIGRAM(S): 20 TABLET, DELAYED RELEASE ORAL at 05:26
